# Patient Record
Sex: MALE | Race: AMERICAN INDIAN OR ALASKA NATIVE
[De-identification: names, ages, dates, MRNs, and addresses within clinical notes are randomized per-mention and may not be internally consistent; named-entity substitution may affect disease eponyms.]

---

## 2019-07-19 ENCOUNTER — HOSPITAL ENCOUNTER (OUTPATIENT)
Dept: HOSPITAL 5 - GIO | Age: 69
Discharge: HOME | End: 2019-07-19
Attending: INTERNAL MEDICINE
Payer: OTHER GOVERNMENT

## 2019-07-19 VITALS — DIASTOLIC BLOOD PRESSURE: 79 MMHG | SYSTOLIC BLOOD PRESSURE: 129 MMHG

## 2019-07-19 DIAGNOSIS — Z79.899: ICD-10-CM

## 2019-07-19 DIAGNOSIS — K64.8: ICD-10-CM

## 2019-07-19 DIAGNOSIS — Z80.8: ICD-10-CM

## 2019-07-19 DIAGNOSIS — M19.90: ICD-10-CM

## 2019-07-19 DIAGNOSIS — I10: ICD-10-CM

## 2019-07-19 DIAGNOSIS — K57.30: ICD-10-CM

## 2019-07-19 DIAGNOSIS — Z12.11: Primary | ICD-10-CM

## 2019-07-19 DIAGNOSIS — Z87.891: ICD-10-CM

## 2019-07-19 DIAGNOSIS — Z80.1: ICD-10-CM

## 2019-07-19 DIAGNOSIS — E78.00: ICD-10-CM

## 2019-07-19 NOTE — ANESTHESIA CONSULTATION
Anesthesia Consult and Med Hx


Date of service: 07/19/19





- Airway


Anesthetic Teeth Evaluation: Dentures


ROM Head & Neck: Adequate


Mental/Hyoid Distance: Adequate


Mallampati Class: Class II


Intubation Access Assessment: Probably Good





- Pre-Operative Health Status


ASA Pre-Surgery Classification: ASA2


Proposed Anesthetic Plan: MAC





- Pulmonary


Hx Smoking: Yes (former)





- Cardiovascular System


Hx Hypertension: Yes (high cholesterol)





- Central Nervous System


Hx Neuromuscular Disorder: Yes (arthritis)

## 2019-07-19 NOTE — PROCEDURE NOTE
Date of procedure: 07/19/19


Pre-op diagnosis: Colon Polyp Screening


Post-op diagnosis: other (No Colon Polyp Screening/Minor,Diverticular Disease/ 

Minor,Internal Hemorrhoid)


Procedure: 





Colonoscopy


Anesthesia: MAC


Surgeon: ANISHA SHEARER


Estimated blood loss: none


Pathology: none


Condition: stable


Disposition: same day (Encourage fiber intake.  Resume home medication and 

follow up in 1 to 2 weeks ([phone]).)

## 2019-07-19 NOTE — OPERATIVE REPORT
PROCEDURE:  Colonoscopy.



INDICATIONS:  The patient is a 69-year-old white male with a family history of

cancer.  His last colonoscopy was 10 years ago when no colon polyps were noted. 

A colonoscopy was done as part of colon polyp screening to assess for any

polyps.



DESCRIPTION OF PROCEDURE:  Procedure was done after getting informed consent

with MAC anesthesia.  Initial rectal exam was unremarkable.  Instrument was

passed through the rectum onto the cecum, which was identified with ileocecal

valve and the appendiceal orifice.  Visualization was fair to good.  Cecum,

ascending colon, transverse colon showed normal mucosa.  There were a few minor

diverticula noted in the left colon and the rectum showed some minor internal

hemorrhoid on the retroverted view.  No biopsies were done.  There was no

bleeding associated with the procedure.  No complications associated with the

procedure.



ASSESSMENT:  Colon polyp screening, no colon polyps noted.  Minor left colon

diverticular disease, minor internal hemorrhoid.



PLAN:  Plan is to have the patient resume previous home medication.  Encourage

fiber supplements and follow up in the office in 1-2 weeks' time.  RNStefany and Kole gaines were in the room during the procedure and the procedure

was also done with assistance of anesthesia.





DD: 07/19/2019 09:58

DT: 07/19/2019 10:09

JOB# [id number]  [id number]

EMILY/HANNAH